# Patient Record
Sex: MALE | Race: WHITE | Employment: UNEMPLOYED | URBAN - METROPOLITAN AREA
[De-identification: names, ages, dates, MRNs, and addresses within clinical notes are randomized per-mention and may not be internally consistent; named-entity substitution may affect disease eponyms.]

---

## 2022-05-13 ENCOUNTER — OFFICE VISIT (OUTPATIENT)
Dept: SURGERY | Age: 52
End: 2022-05-13
Payer: MEDICAID

## 2022-05-13 VITALS
TEMPERATURE: 97.1 F | OXYGEN SATURATION: 96 % | SYSTOLIC BLOOD PRESSURE: 117 MMHG | HEART RATE: 66 BPM | WEIGHT: 315 LBS | BODY MASS INDEX: 39.17 KG/M2 | HEIGHT: 75 IN | RESPIRATION RATE: 16 BRPM | DIASTOLIC BLOOD PRESSURE: 54 MMHG

## 2022-05-13 DIAGNOSIS — R60.9 EDEMA, UNSPECIFIED TYPE: ICD-10-CM

## 2022-05-13 DIAGNOSIS — I10 PRIMARY HYPERTENSION: ICD-10-CM

## 2022-05-13 DIAGNOSIS — J44.9 CHRONIC OBSTRUCTIVE PULMONARY DISEASE, UNSPECIFIED COPD TYPE (HCC): ICD-10-CM

## 2022-05-13 DIAGNOSIS — I87.8 VENOUS STASIS: ICD-10-CM

## 2022-05-13 DIAGNOSIS — E66.01 MORBID OBESITY (HCC): Primary | ICD-10-CM

## 2022-05-13 DIAGNOSIS — G47.30 SLEEP APNEA, UNSPECIFIED TYPE: ICD-10-CM

## 2022-05-13 DIAGNOSIS — E66.01 MORBID OBESITY WITH BMI OF 60.0-69.9, ADULT (HCC): ICD-10-CM

## 2022-05-13 DIAGNOSIS — Z79.01 ANTICOAGULATED: ICD-10-CM

## 2022-05-13 DIAGNOSIS — Z86.718 HISTORY OF DVT (DEEP VEIN THROMBOSIS): ICD-10-CM

## 2022-05-13 DIAGNOSIS — E88.01 ALPHA-1-ANTITRYPSIN DEFICIENCY (HCC): ICD-10-CM

## 2022-05-13 DIAGNOSIS — I48.91 ATRIAL FIBRILLATION, UNSPECIFIED TYPE (HCC): ICD-10-CM

## 2022-05-13 DIAGNOSIS — Z87.891 SMOKING HISTORY: ICD-10-CM

## 2022-05-13 PROCEDURE — 99205 OFFICE O/P NEW HI 60 MIN: CPT | Performed by: SPECIALIST

## 2022-05-13 RX ORDER — ALPHA1-PROTEINASE INHIBITOR (HUMAN) 1000 MG/20ML
INJECTION, SOLUTION INTRAVENOUS
COMMUNITY
Start: 2022-05-02

## 2022-05-13 RX ORDER — WARFARIN 10 MG/1
TABLET ORAL
COMMUNITY
Start: 2022-04-11

## 2022-05-13 RX ORDER — ALBUTEROL SULFATE 90 UG/1
AEROSOL, METERED RESPIRATORY (INHALATION)
COMMUNITY
Start: 2022-04-27

## 2022-05-13 RX ORDER — IPRATROPIUM BROMIDE AND ALBUTEROL SULFATE 2.5; .5 MG/3ML; MG/3ML
3 SOLUTION RESPIRATORY (INHALATION)
COMMUNITY
Start: 2021-08-26

## 2022-05-13 RX ORDER — TIOTROPIUM BROMIDE 18 UG/1
CAPSULE ORAL; RESPIRATORY (INHALATION)
COMMUNITY
Start: 2022-04-20

## 2022-05-13 RX ORDER — FLUTICASONE PROPIONATE AND SALMETEROL XINAFOATE 115; 21 UG/1; UG/1
AEROSOL, METERED RESPIRATORY (INHALATION)
COMMUNITY
Start: 2022-05-03

## 2022-05-13 RX ORDER — HYDROCHLOROTHIAZIDE 25 MG/1
TABLET ORAL
COMMUNITY
Start: 2022-05-05

## 2022-05-13 RX ORDER — POTASSIUM CHLORIDE 20 MEQ/1
20 TABLET, EXTENDED RELEASE ORAL 2 TIMES DAILY
COMMUNITY
Start: 2022-04-16

## 2022-05-13 RX ORDER — DILTIAZEM HYDROCHLORIDE 120 MG/1
CAPSULE, COATED, EXTENDED RELEASE ORAL
COMMUNITY
Start: 2022-05-10

## 2022-05-13 NOTE — PROGRESS NOTES
Bariatric Surgery Consultation    Subjective: The patient is a 49 y.o. obese male with a Body mass index is 60.52 kg/m². .  The patient is currently his heaviest weight for the past several years. he has been overweight since childhood. he has been considering surgery since last year. he desires surgery at this time because of multiple health concerns and their lifestyle issues which are hindered by their weight. he has been referred by Dr. Easton Byrne for evaluation and treatment of their obesity via surgical intervention. Austin Michele has tried multiple diets in his lifetime most recently tried behavior modification and unsupervised diets    Bariatric comorbidities present are   Patient Active Problem List   Diagnosis Code    Morbid obesity ([facility] E66.01    COPD (chronic obstructive pulmonary disease) ([facility] J44.9    Edema R60.9    Sleep apnea G47.30    Atrial fibrillation (HCC) I48.91    Alpha-1-antitrypsin deficiency ([facility] E88.01    Anticoagulated Z79.01    Morbid obesity with BMI of 60.0-69.9, adult ([facility] E66.01, Z68.44    Hypertension I10    History of DVT (deep vein thrombosis) Z86.718    Venous stasis I87.8    Smoking history Z87.891       The patient is considering laparoscopic sleeve gastrectomy for surgical weight loss due to their ineffective progress with medical forms of weight loss and the urging of their physician who cares for their primary medical issues. The patient  now presents  for consideration for weight loss surgery understanding the benefits of this over a medical approach of weight loss as was discussed in our presentation on weight loss surgery. They have discussed their plans both with their family and primary care physician who is in support of their pursuit of such. The patient has had no health issues as of late and denies and gastrointestinal disturbances other than what is outlined below in their review of symptoms.  All of their prior evaluations available by both their PCP's and specialists physicians have been reviewed today either in the Care Everywhere portal or scanned under the media tab. I have spent a large portion of my initial consultation today reviewing the patients current dietary habits which have contributed to their health issues and obesity. I have suggested to them personally a dietary regimen that they can initiate now to help with their status as it pertains to their weight. They understand that the most important aspect of their journey through their weight loss endeavor will be their adherence to a new lifestyle of healthy eating behavior. They also understand that an adherence to an exercise program will not only help with weight loss but is ultimately important in weight maintenance. The patients goal weight is 215 lb. Patient Active Problem List    Diagnosis Date Noted    Morbid obesity ([facility]     COPD (chronic obstructive pulmonary disease) ([facility]     Edema     Sleep apnea     Atrial fibrillation ([facility]     Alpha-1-antitrypsin deficiency ([facility]     Anticoagulated     Morbid obesity with BMI of 60.0-69.9, adult ([facility]     Hypertension     History of DVT (deep vein thrombosis)     Venous stasis     Smoking history      Past Surgical History:   Procedure Laterality Date    HX OTHER SURGICAL      pt states he had a \"venous closure surgery\" on his left leg      Social History     Tobacco Use    Smoking status: Former Smoker     Quit date: 2012     Years since quitting: 10.3    Smokeless tobacco: Never Used   Substance Use Topics    Alcohol use: Not Currently      No family history on file. Current Outpatient Medications   Medication Sig Dispense Refill    albuterol (PROVENTIL HFA, VENTOLIN HFA, PROAIR HFA) 90 mcg/actuation inhaler inhale 2 puffs by mouth every 4 hours if needed for wheezing      Advair -21 mcg/actuation inhaler inhale 2 puffs twice a day RINSE MOUTH WITH WATER AFTER USE. DON'T SWALLOW.       dilTIAZem ER (CARDIZEM CD) 120 mg capsule       Prolastin-C infusion       hydroCHLOROthiazide (HYDRODIURIL) 25 mg tablet take 1 and 1/2 tablets by mouth once daily      albuterol-ipratropium (DUO-NEB) 2.5 mg-0.5 mg/3 ml nebu Take 3 mL by inhalation every six (6) hours as needed.  potassium chloride (K-DUR, KLOR-CON M20) 20 mEq tablet Take 20 mEq by mouth two (2) times a day.  Spiriva with HandiHaler 18 mcg inhalation capsule PLACE 1 CAPSULE INTO INHALER AND INHALE ONCE DAILY      warfarin (COUMADIN) 10 mg tablet take 1 tablet by mouth ON TUESDAY, THURSDAY, SATURDAY, AND SUNDAY. ..  (REFER TO PRESCRIPTION NOTES).        Allergies   Allergen Reactions    Levofloxacin Hives    Aspirin Swelling        Review of Systems:     General - No history or complaints of unexpected fever, chills, or weight loss  Head/Neck - No history or complaints of headache, diplopia, dysphagia, hearing loss  Cardiac - No history or complaints of chest pain, palpitations, murmur, or shortness of breath  Pulmonary - No history or complaints of productive cough, hemoptysis  Gastrointestinal - (+) reflux, no abdominal pain, obstipation/constipation or blood per rectum  Genitourinary - No history or complaints of hematuria/dysuria, stress urinary incontinence symptoms, or renal lithiasis  Musculoskeletal - midl joint pain in their knees,  no muscular weakness  Hematologic - No history or complaints of blood transfusions  Neurologic - No history or complaints of  migraine headaches, seizure activity, syncopal episodes, TIA or stroke  Integumentary - No history or complaints of rashes, abnormal nevi, skin cancer    Objective:     Visit Vitals  BP (!) 117/54   Pulse 66   Temp 97.1 °F (36.2 °C)   Resp 16   Ht 6' 3\" (1.905 m)   Wt (!) 219.6 kg (484 lb 3.2 oz)   SpO2 96%   BMI 60.52 kg/m²     Physical Examination: General appearance - alert, well appearing, and in no distress  Mental status - alert, oriented to person, place, and time  Eyes - pupils equal and reactive, extraocular eye movements intact  Nose - normal and patent, no erythema, discharge or polyps  Mouth - mucous membranes moist, pharynx normal without lesions  Neck - supple, no significant adenopathy  Lymphatics - no palpable lymphadenopathy, no hepatosplenomegaly  Chest - clear to auscultation, no wheezes, rales or rhonchi, symmetric air entry  Heart - normal rate, regular rhythm, normal S1, S2, no murmurs, rubs, clicks or gallops  Abdomen - soft, nontender, massive low hanging pannus extending to his upper thighs. He has evidence of a yeast rash under his pannus. In addition he has severe central obesity at his upper abdomen. Back exam - full range of motion, no tenderness, palpable spasm or pain on motion  Neurological - alert, oriented, normal speech, no focal findings or movement disorder noted  Musculoskeletal - no joint tenderness, deformity or swelling  Extremities - significant signs of venous stasis disease of the lower extremities (greater on the left) with engorged superficial veins  Skin - normal coloration and turgor, no rashes, no suspicious skin lesions noted    Labs:       I spent a considerable amount of time today in excess of 20 to 30 minutes reviewing his care everywhere chart in the [facility] system. We reviewed his pulmonary function studies and his prior lab work. Assessment:     Morbid obesity with comorbidity    Plan:     laparoscopic sleeve gastrectomy    This is a 49 y.o. male with a BMI of Body mass index is 60.52 kg/m². and the weight-related co-morbidties as noted below. Rollie Skiff meets the NIH criteria for bariatric surgery based upon the BMI of Body mass index is 60.52 kg/m². and multiple weight-related co-morbidties.  Rollie Skiff has elected laparoscopic sleeve gastrectomy as his intervention of choice for treatment of morbid obestiy through surgical means secondary to its safety profile, rapid return to work  and decreases in operative risks over gastric bypass. In the office today, following Howard's history and physical examination, a 30 minute discussion regarding the anatomic alterations for the laparoscopic sleeve gastrectomy was undertaken. The dietary expectations and the patient and physician dependent factors for success were thoroughly discussed, to include the need for interval follow-up and long-term dietary changes associated with success. The possible complications of the sleeve gastrectomy  were also discussed, to include;death, DVT/PE, staple line leak, bleeding, stricture formation, infection, nutritional deficiencies and sleeve dilation. Specific weight related outcomes for success were also discussed with an emphasis on careful and close follow-up with the first year and eating behavior modification as the baseline and cyclical hunger return. The patient expressed an understanding of the above factors, and his questions were answered in their entirety. In addition, the patient attended a 1.5 hour power point seminar regarding obesity, surgical weight loss including, adjustable gastric band, gastric bypass, and sleeve gastrectomy. This discussion contrasted the different surgical techniques, mechanisms of actions and expected outcomes, and surgical and medical risks associated with each procedure. During this seminar, there was a long question and answer session where each questions was answered until there were no additional questions. Today, the patient had all of his questions answered and desires to proceed with  bariatric surgery initially choosing sleeve gastrectomy as his surgical option. The patient and I have discussed his innumerous health issues which are closely linked to his obesity issue. He does understand that given his multiple health issues his life expectancy is quite shortened and he is very eager to try to lose weight to help alleviate these issues.   He has lost about 30 pounds since the pandemic began with dietary modification and exercise. The patient is amenable to beginning a medical weight loss plan within our office over 6-month time. Which is mandated by his insurance company. At the end of that 6-month process if he wishes to proceed with surgery we will be amenable to that but we have given him a 30 pound weight loss goal prior to that surgical date in the late fall. He understands that if he does decide to have surgery that he will need to be cleared by both his cardiologist and pulmonologist.  The patient is scheduled to see Dr. Heri Randall in 2 months for his routine pulmonary function studies. My estimation is that based on his prior pulmonary function study he likely has an FEV1 at around 1.5-1.6. This would be consistent with the ability to proceed with surgery but we will still ask for Dr. Heri Randall to give a surgical clearance. Narrativ - Performed by [facility] obstructive lung disease with FEV1 51%.       Lung volume shows ERV 35% is consistent with obesity.       DLCO is mildly reduced to 73%. Secondary Diagnoses:     Dietary Intervention  - The patient is currently scheduled to see or has been followed by a bariatric nutritionist for an attempt at preoperative weight loss as has been dictated by their insurance carrier. They will be assessed at various times during their follow up to evaluate their progress depending on the length of time that is required once again by their carrier. I have explained the importance of preoperative weight loss and the benefits regarding lower surgical risk and also assisting the patient in reaching their weight loss goal.  Finally they understand their is a physiologic benefit from the standpoint of hepatic volume reduction preoperatively. I have reiterated the importance of a low carbohydrate and high protein regimen to achieve their stated goal. Weight loss goal is 30 pounds.      GERD -The patient understands that weight loss surgery is not a guaranteed cure for reflux disease but does understand the benefits that weight loss can have on reflux disease. They also understand that at the time of surgery the gastroesophageal junction will be evaluated for the presence of a diaphragmatic hernia. Hernias will be corrected always with the gastric band and sleeve gastrectomy procedures, but only on a case by case basis with the gastric bypass if it prevents our ability to perform the operation at hand, or if I feel that they would benefit long term with correction of this issue. The patient also understands that neither weight loss surgery nor repair of a diaphragmatic hernia repair guarantees the complete cessation of the disease. They also understand there is a possibility of recurrence with a simple crural repair as is performed with these procedures. They understand they may have to continue their medications in the postoperative period. They have a good understanding that the gastric bypass procedure is better suited to total resolution of this issue and that neither the Lap Band nor sleeve gastrectomy is considered a curative procedure as it pertains to this diagnosis. Obstructive Sleep Apnea -The patient understands the association of sleep apnea and obesity and the additional risk that it caries related to post surgical complications. We will have the patient bring their CPAP machine to the hospital for use both postoperatively in the PACU and on the floor at its appropriate setting.  We will have them continue using it while at home after surgery and follow up with their pulmonologist 6 months after to be retested to see if it can be discontinued at that time period. Hypertension - The patient has a clear understanding of how weight loss improves hypertension as a whole, but also they understand that there is a significant genetic component to this disease process.  We will monitor the patients blood pressure while in the hospital and the plan would be to continue those medications postoperatively.  If a diuretic is being used we will stop them on discharge to prevent dehydration particularly with the sleeve gastrectomy and the gastric bypass procedures.  They will be instructed to monitor their blood pressure postoperatively while at home and notify their primary care physician in the event of any significantly high or uncharacteristic readings. A-Fib- The patient has undergone or will undergo a preoperative evaluation by their cardiologist, or a cardiologist of their choice such that they are deemed a reasonable candidate for surgery. The patient understands that with a history of cardiac disease that there is always an increased risk compared   to the average patient. Appropriate recommendations have been followed as recommended by the cardiologist.  The patients ASA will be resumed   approximately 1 month postoperatively in a coated form if appropriate. The patient will see Dr Amelia Cedeno For cardiac clearance prior to surgery. Total time reviewing the patient's considerably difficult and complex medical history, his prior evaluations within the [facility] system and performing a physical examination on him today was in excess of 70 minutes duration.     Signed By: Lesli Wilson MD     May 13, 2022

## 2022-05-25 ENCOUNTER — HOSPITAL ENCOUNTER (OUTPATIENT)
Age: 52
Setting detail: OUTPATIENT SURGERY
Discharge: HOME | End: 2022-05-25
Attending: SPECIALIST | Admitting: SPECIALIST
Payer: MEDICAID

## 2022-05-25 ENCOUNTER — APPOINTMENT (OUTPATIENT)
Dept: GENERAL RADIOLOGY | Age: 52
End: 2022-05-25
Attending: SPECIALIST
Payer: MEDICAID

## 2022-05-25 ENCOUNTER — APPOINTMENT (OUTPATIENT)
Dept: SURGERY | Age: 52
End: 2022-05-25

## 2022-05-25 VITALS
DIASTOLIC BLOOD PRESSURE: 84 MMHG | RESPIRATION RATE: 17 BRPM | OXYGEN SATURATION: 95 % | BODY MASS INDEX: 39.17 KG/M2 | SYSTOLIC BLOOD PRESSURE: 128 MMHG | HEART RATE: 72 BPM | WEIGHT: 315 LBS | HEIGHT: 75 IN | TEMPERATURE: 95.4 F

## 2022-05-25 DIAGNOSIS — E66.01 MORBID OBESITY (HCC): ICD-10-CM

## 2022-05-25 DIAGNOSIS — K21.9 GASTROESOPHAGEAL REFLUX DISEASE, UNSPECIFIED WHETHER ESOPHAGITIS PRESENT: ICD-10-CM

## 2022-05-25 PROCEDURE — 74240 X-RAY XM UPR GI TRC 1CNTRST: CPT | Performed by: SPECIALIST

## 2022-05-25 PROCEDURE — 74011000250 HC RX REV CODE- 250: Performed by: SPECIALIST

## 2022-05-25 PROCEDURE — 76040000019: Performed by: SPECIALIST

## 2022-05-25 PROCEDURE — 74240 X-RAY XM UPR GI TRC 1CNTRST: CPT

## 2022-05-25 NOTE — PROCEDURES
Patient:Howard Sapp   : 1970  Medical Record AURC:[id number]            PREPROCEDURE DIAGNOSIS: This patient is preoperative for laparoscopic sleeve gastrectomy procedure with a history of  reflux disease. POSTPROCEDURE DIAGNOSIS: This patient is preoperative for laparoscopic sleeve gastrectomy procedure with a history of  reflux disease. PROCEDURES PERFORMED: Upper GI study with barium. ESTIMATED BLOOD LOSS: None. SPECIMENS: None. STATEMENT OF MEDICAL NECESSITY: The patient is a patient with a  longstanding history of obesity. They are now considering the laparoscopic sleeve gastrectomy procedure as a means of surgical weight control and due to their history of reflux disease and are being assessed preoperatively for such. DESCRIPTION OF PROCEDURE: The patient was brought to the fluoroscopy unit and  was given thin barium. On swallowing of barium, they were noted to have  normal peristalsis of their esophagus. They had prompt filling of distal  esophagus with tapering into the gastroesophageal junction. There was no evidence of a hiatal hernia present. Contrast then filled the gastric cardia, fundus,body and pre pyloric region with no abnormalities noted. Contrast then exited the pylorus in normal fashion. No obstruction was noted. There was no evidence of reflux noted.     (normal anatomy)    Kat Abel MD

## 2022-07-12 ENCOUNTER — HOSPITAL ENCOUNTER (OUTPATIENT)
Dept: BARIATRICS/WEIGHT MGMT | Age: 52
Discharge: HOME | End: 2022-07-12

## 2022-07-15 VITALS — WEIGHT: 315 LBS | BODY MASS INDEX: 39.17 KG/M2 | HEIGHT: 75 IN

## 2022-07-15 NOTE — PROGRESS NOTES
Medical Weight Loss Multi-Disciplinary Program    Patient's Name: Clement Bai Age: 49 y.o. YOB: 1970   Sex: male    Session #1. Pt attended in-person class. Weight obtained in office. Date: 7/15/2022    Visit Vitals  Ht 6' 3\" (1.905 m)   Wt (!) 219.6 kg (484 lb 3.2 oz)   BMI 60.52 kg/m²       Pounds Lost since last month: N/A Pounds Gained since last month: N/A    Starting Weight: 484.2 lbs  Overall Pounds Lost: 0 lbs   Overall Pounds Gained: 0.5 lbs    Do you smoke? No    Alcohol intake:  Number of drinks per week: 0    Class Guidelines    Guidelines are reviewed with patient at the start of every class. 1. Patient understands that weight loss trial classes must be consecutive. Patient understands if they miss a class, it is their responsibility to contact me to reschedule class. I will reach out to patient after their first no show. 2.  Patient understands the expectations that weight maintenance/weight loss is expected during the classes. Failure to demonstrate changes may result in extension of weight loss trial, followed by returning to see the surgeon. Patient understands that they CANNOT gain any weight during the weight loss trial.  Gaining weight will result in extension of weight loss trial.  3. Patient is also instructed to complete their labwork, psychological evaluation visit, and any other tests that the surgeon has used while they are working on their weight loss trial.  4.  Patient was instructed to bring their packet of nutrition education materials to every class and appointment. Other Pertinent Information    Changes Made in past month: \"heart healthy, less exercise this month because of weather. \"      Eating Habits and Behaviors      Today in class we reviewed the Key Diet Principles. Patient was encouraged to consume 3 meals each day, and meal timing was reviewed.   Meal time behaviors that will help pt to be successful with their weight loss efforts were also discussed. We discussed the importance of drinking adequate amounts of fluids, recommending that patient consume a minimum of 64 oz of sugar-free, caffeine-free and carbonation-free fluids each day. Patient was encouraged to eliminate sugar-sweetened beverages such as sweet tea, fruit juice, fruit smoothies, flavored coffee drinks and regular sodas. During the weight loss trial, patient was encouraged to focus on eating protein-forward meals, with a daily goal of  grams protein. Patient was also advised to decrease carbohydrate intake to <100 g/d, choosing complex vs. simple carbohydrates in limited amounts. We also discussed limiting fat intake. Encouraged patient to follow the \"3-gram rule\" when choosing foods by looking for items containing <3 g of fat and sugar per serving. We reviewed meal planning guidelines and discussed appropriate meal and snack options. We also talked about appropriate protein drinks and patient was encouraged to start trying these, using them either for a meal replacement or a protein-rich snack. We reviewed the nutritional guidelines for selecting protein shakes. Pre-operative vitamin and mineral supplementation was reviewed. Patient was instructed to choose a chewable complete multivitamin with iron in NON-gummy form. Selection of probiotic was also reviewed. Patient's current diet habits include:  Patient is eating 2-3 meals and 2 snacks per day. Patient is measuring portions out. Patient indicates they are eating out 1-2 times per week. This includes fast food, carry out, and sit down restaurants.   Per dietary recall, pts diet has the following concerns: inadequate protein intake, excess carbohydrate intake at meals (3 cups cereal, 2 slices toast at breakfast, starches at dinner); possibly inadequate protein intake (cup of soup for lunch)    Physical Activity/Exercise    Comments:  Patient is currently walking and weights for activity 30-45min/d x 3d/wk. We talked about recommended types of physical activity, including walking, swimming, cycling, or chair exercises. I also talked with patient about doing some strength training, which helps the metabolism, as well as strengthen muscles and bones. Patient's plan to incorporate more activity includes: \"increase activity\"      Behavior Modification     Comments:  During today's class, we discussed planning & prepping meals while on vacation as behavior change tool to improve eating behaviors, promote weight loss and long-term weight maintenance, encourage a healthy relationship with food, and prevent chronic disease. Patient was directed to the handout on \"Being Healthy on Vacation\", for guidelines to use while on vacation and as a reminder to limit or avoid Alcohol, keep splurges small, use protein drinks when needed for protein needs and to help stave off hunger while out and about. Additionally, discussed meal planning tips in relation to where the pt is staying and how they are travelling. Goals:   1. Work to increase to 3-4 small meals per day, with 1-2 planned snacks as needed. Recommend following the plate method for meal planning - focusing on lean protein, non-starchy vegetables, and measured amounts of complex carbohydrates. - Goal of  g protein and <100 g carbohydrates per day. - Select at least 2 DIFFERENT protein supplements that can be used for protein supplementation to meet goals pre- and post-operatively. - Practice Carbohydrate Counting and label reading.   - Follow 3 g rule for fat and sugar. 2. Slow down meals  - Chew each bite 25-35 times. - Aim for 20-30 min/meal.  3. Increase non-caloric fluids to 64 oz per day. Eliminate caffeine, added sugar, carbonation, and straws.               - Continue to work to decrease sugar sweetened beverages - goal of calorie-free beverages only.               - Must eliminate caffeine prior to surgery and avoid for ~6-8 weeks. - Practice 30:30 rule,  food and fluid intake. 4. Start activity regimen, work to increase ADLs. 5. Start Complete MVI and probiotic at least 30 days pre-op. Candidate for surgery (per RD): PENDING, Pt scheduled for nutrition education on 8/4/22.

## 2022-08-04 ENCOUNTER — HOSPITAL ENCOUNTER (OUTPATIENT)
Dept: BARIATRICS/WEIGHT MGMT | Age: 52
Discharge: HOME | End: 2022-08-04

## 2022-08-04 VITALS — HEIGHT: 75 IN | BODY MASS INDEX: 39.17 KG/M2 | WEIGHT: 315 LBS

## 2022-08-04 NOTE — PROGRESS NOTES
Medical Weight Loss Multi-Disciplinary Program    Patient's Name: Poonam Obrien Age: 49 y.o. YOB: 1970 Sex: male     Session #2. Pt attended in-person class. Weight obtained in office. Date: 8/4/2022    Visit Vitals  Ht 6' 3\" (1.905 m)   Wt (!) 216.4 kg (477 lb 1.6 oz)   BMI 59.63 kg/m²       Pounds Lost since last month: 7.6 lbs Pounds Gained since last month: 0.0 lbs       Starting Weight: 484.2 lbs Previous Months Weight: 484.7 lbs   Overall Pounds Lost: 7.1 lbs  Overall Pounds Gained: 0.0 lbs      Note that pt has a pre-operative weight loss goal of 30 lbs. Pt has not  met this goal.    Do you smoke? no    Alcohol intake:  Number of drinks per week: 0    Class Guidelines    Guidelines are reviewed with patient at the start of every class. 1. Patient understands that weight loss trial classes must be consecutive. Patient understands if they miss a class, it is their responsibility to contact me to reschedule class. I will reach out to patient after their first no show. 2.  Patient understands the expectations that weight maintenance/weight loss is expected during the classes. Failure to demonstrate changes may result in extension of weight loss trial, followed by returning to see the surgeon. Patient understands that they CANNOT gain any weight during the weight loss trial.  Gaining weight will result in extension of weight loss trial.  3. Patient is also instructed to complete their labwork, psychological evaluation visit, and any other tests that the surgeon has ordered while they are working on their weight loss trial.  4.  Patient was instructed to bring their packet of nutrition education materials to every class and appointment. Other Pertinent Information    Changes Made Since Last Class: NONE NOTED. Patient's plan for dietary and/or behavior changes in the upcoming month: NONE NOTED.       Eating Habits and Behaviors      Today in class we reviewed the Key Diet Principles. Patient was encouraged to consume 3 meals each day, and meal timing was reviewed. Meal time behaviors that will help pt to be successful with their weight loss efforts were also discussed. We discussed the importance of drinking adequate amounts of fluids, recommending that patient consume a minimum of 64 oz of sugar-free, caffeine-free and carbonation-free fluids each day. Patient was encouraged to eliminate sugar-sweetened beverages such as sweet tea, fruit juice, fruit smoothies, flavored coffee drinks and regular sodas. During the weight loss trial, patient is encouraged to focus on eating protein-forward meals, with a daily goal of  grams protein. Patient was also advised to decrease carbohydrate intake to <100 g/d, choosing complex vs. simple carbohydrates in limited amounts. We also discussed limiting fat intake. Encouraged patient to follow the \"3-gram rule\" when choosing foods by looking for items containing <3 g of fat and sugar per serving. We reviewed meal planning guidelines and discussed appropriate meal and snack options. We also talked about appropriate protein drinks and patient was encouraged to start trying these, using them either for a meal replacement or a protein-rich snack pre-operatively. We reviewed the nutritional guidelines for selecting protein shakes. Pre-operative vitamin and mineral supplementation was reviewed. Patient was instructed to choose a chewable complete multivitamin with iron in NON-gummy form. Selection of probiotic was also reviewed. Patient's current diet habits include:  Patient is eating 3 meals and 1 snacks per day. Patient is  measuring portions out. Patient indicates they are eating out 1-2 times per month. This includes fast food, carry out, and sit down restaurants.   Per dietary recall, pts diet has the following concerns:   Pt is consuming a high percentage of his calories from carbohydrates, as he is consuming sometimes 2-4 servings of carbohydrates at a meal.  Pt is consuming caffeinated beverages. Pt is consuming carbonated beverages. Pt has not tried protein supplement shakes for use post-op in meeting their protein needs yet. Physical Activity/Exercise    Comments:  Patient is currently \"walking, weights, stretching\"  for activity 20-30 min/d x 3 d/wk. We talked about recommended types of physical activity, including walking, swimming, cycling, or chair exercises. I also talked with patient about doing some strength training, which helps the metabolism, as well as strengthen muscles and bones. Patient's plan to incorporate more activity includes: \"I plan on continuing with what works and what I'm able to do comfortably. Gradually increasing the time I am exercising\". Behavior Modification     Comments:  During today's class, we discussed the benefits of regular physical activity. Specific guidelines for cardiovascular exercise and resistance/strength training were reviewed, as well as appropriate types of physical activity. Patient was directed to the handout, \"Overcoming Barriers to Exercise\", where we reviewed the importance of making physical activity part of a healthy lifestyle rather than just a way of meeting a weight loss goal.  We also discussed  for specific tips on fitting physical activity in when patients feel they are too busy to exercise and during inclement weather, as well as ideas for low/no cost exercise, and ways to exercise despite physical limitations. Patient was instructed to discuss any physical limitations with their healthcare provider. A list of ways to facilitate regular physical activity was reviewed, as well as how to get started with a regular physical activity regimen. Goals: Work to increase to 3-4 small meals per day, with 1-2 planned snacks as needed.   Recommend following the plate method for meal planning - focusing on lean protein, non-starchy vegetables, and measured amounts of complex carbohydrates. - Goal of  g protein and <100 g carbohydrates per day. - Select at least 2 DIFFERENT protein supplements that can be used for protein supplementation to meet goals pre- and post-operatively. - Practice Carbohydrate Counting and label reading.   - Follow 3 g rule for fat and sugar. 2. Slow down meals  - Chew each bite 25-35 times. - Aim for 20-30 min/meal.  3. Increase non-caloric fluids to 64 oz per day. Eliminate caffeine, added sugar, carbonation, and straws.               - Continue to work to decrease sugar sweetened beverages - goal of calorie-free beverages only. - Must eliminate caffeine prior to surgery and avoid for ~6-8 weeks. - Practice 30:30 rule,  food and fluid intake. 4. Start activity regimen, work to increase ADLs. 5. Start Complete MVI and probiotic at least 30 days pre-op. Candidate for surgery (per RD): PENDING, Pt scheduled for nutrition education on 9/6/2022.